# Patient Record
Sex: FEMALE
[De-identification: names, ages, dates, MRNs, and addresses within clinical notes are randomized per-mention and may not be internally consistent; named-entity substitution may affect disease eponyms.]

---
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
## 2023-08-08 ENCOUNTER — HOSPITAL ENCOUNTER (EMERGENCY)
Dept: HOSPITAL 92 - ERS | Age: 23
Discharge: HOME | End: 2023-08-08
Payer: COMMERCIAL

## 2023-08-08 DIAGNOSIS — K64.8: Primary | ICD-10-CM

## 2023-08-08 DIAGNOSIS — F17.290: ICD-10-CM

## 2023-08-08 LAB
BACTERIA UR QL AUTO: (no result) HPF
CAUTI INDICATIONS FOR CULTURE: (no result)
LEUKOCYTE ESTERASE UR QL STRIP.AUTO: 75 LEU/UL
RBC UR QL AUTO: (no result) HPF (ref 0–3)
SP GR UR STRIP: 1.01 (ref 1–1.04)
WBC UR QL AUTO: (no result) HPF (ref 0–3)

## 2023-08-08 PROCEDURE — 81001 URINALYSIS AUTO W/SCOPE: CPT
